# Patient Record
Sex: MALE | URBAN - NONMETROPOLITAN AREA
[De-identification: names, ages, dates, MRNs, and addresses within clinical notes are randomized per-mention and may not be internally consistent; named-entity substitution may affect disease eponyms.]

---

## 2022-10-03 ENCOUNTER — OFFICE VISIT (OUTPATIENT)
Dept: URBAN - NONMETROPOLITAN AREA CLINIC 6 | Facility: CLINIC | Age: 72
End: 2022-10-03

## 2022-10-03 DIAGNOSIS — H25.813 COMBINED FORMS OF AGE-RELATED CATARACT, BILATERAL: ICD-10-CM

## 2022-10-03 DIAGNOSIS — H35.3231 EXUDATIVE MACULAR DEGENERATION, WITH ACTIVE CHOROIDAL NEOVASCULARIZATION, BILATERAL: Primary | ICD-10-CM

## 2022-10-03 PROCEDURE — 99204 OFFICE O/P NEW MOD 45 MIN: CPT | Performed by: STUDENT IN AN ORGANIZED HEALTH CARE EDUCATION/TRAINING PROGRAM

## 2022-10-03 PROCEDURE — 92134 CPTRZ OPH DX IMG PST SGM RTA: CPT | Performed by: STUDENT IN AN ORGANIZED HEALTH CARE EDUCATION/TRAINING PROGRAM

## 2022-10-03 PROCEDURE — 92015 DETERMINE REFRACTIVE STATE: CPT | Performed by: STUDENT IN AN ORGANIZED HEALTH CARE EDUCATION/TRAINING PROGRAM

## 2022-10-03 ASSESSMENT — VISUAL ACUITY
OS: 20/400
OD: 20/200

## 2022-10-03 ASSESSMENT — INTRAOCULAR PRESSURE
OD: 18
OS: 19

## 2022-10-03 NOTE — IMPRESSION/PLAN
Impression: Exudative macular degeneration, with active choroidal neovascularization, bilateral: H35.3236. Plan: Patient educated on findings and that neovascular membrane is cause of reduced vision. Recommend Avastin injection. R/B/As reviewed with patient. Precautions after injection (no swimming/hot tubbing 5-7 days after injection or CL wear for 2-3 days) reviewed with patient. Avoid touching eye. Patient educated to RTC immediately if the eye becomes red, painful, or a decrease in vision is noted. Patient understands and requests referral. X[employer] grid dispensed to patient. Recommend AREDS2 Vitamins PO BID. Patient reports he is unable to pay for treatments. Recommend patient use Medicare (patient reports he has medicare but does not want to use it) as cost for procedure is great if self-pay. PATIENT ED THAT HE IS NOT LEGAL TO DRIVE IN [facility] AND THAT GLASSES WILL NOT FIX PROBLEM.

## 2022-10-26 ENCOUNTER — OFFICE VISIT (OUTPATIENT)
Dept: URBAN - NONMETROPOLITAN AREA CLINIC 6 | Facility: CLINIC | Age: 72
End: 2022-10-26

## 2022-10-26 DIAGNOSIS — H35.3233 EXUDATIVE MACULAR DEGENERATION, INACTIVE SCAR, BILATERAL: Primary | ICD-10-CM

## 2022-10-26 PROCEDURE — 92134 CPTRZ OPH DX IMG PST SGM RTA: CPT | Performed by: OPHTHALMOLOGY

## 2022-10-26 PROCEDURE — 92004 COMPRE OPH EXAM NEW PT 1/>: CPT | Performed by: OPHTHALMOLOGY

## 2022-10-26 ASSESSMENT — INTRAOCULAR PRESSURE
OD: 20
OS: 20

## 2022-10-26 NOTE — IMPRESSION/PLAN
Impression: Exudative macular degeneration, inactive scar, bilateral: P62.2001. Plan: End-stage macular degeneration with subretinal fibrosis OU. Observe.  Retina PRN